# Patient Record
Sex: MALE | Race: WHITE | Employment: FULL TIME | URBAN - METROPOLITAN AREA
[De-identification: names, ages, dates, MRNs, and addresses within clinical notes are randomized per-mention and may not be internally consistent; named-entity substitution may affect disease eponyms.]

---

## 2022-01-28 NOTE — PROGRESS NOTES
[address] [facility] time____________        Surgery time____________      Do not eat  anything after 12:00 midnight prior to your surgery. Nothing to drink within 5 hours of procedure. This includes water chewing gum, mints and ice chips. You may brush your teeth and gargle the morning of your surgery, but do not swallow the water     Please see your family doctor/pediatrician for a history and physical and/or concerning medications. Bring any test results/reports from your physicians office. If you are under the care of a heart doctor or specialist doctor, please be aware that you may be asked to them for clearance    You may be asked to stop blood thinners such as Coumadin, Plavix, Fragmin, Lovenox, etc., or any anti-inflammatories such as:  Aspirin, Ibuprofen, Advil, Naproxen prior to your surgery. We also ask that you stop any OTC medications such as fish oil, vitamin E, glucosamine, garlic, Multivitamins, COQ 10, etc.    We ask that you do not smoke 24 hours prior to surgery  We ask that you do not  drink any alcoholic beverages 24 hours prior to surgery     You must make arrangements for a responsible adult to take you home after your surgery. For your safety you will not be allowed to leave alone or drive yourself home. Your surgery will be cancelled if you do not have a ride home. Also for your safety, it is strongly suggested that someone stay with you the first 24 hours after your surgery. A parent or legal guardian must accompany a child scheduled for surgery and plan to stay at the hospital until the child is discharged. Please do not bring other children with you. For your comfort, please wear simple loose fitting clothing to the hospital.  Please do not bring valuables. Do not wear any make-up or nail polish on your fingers or toes      For your safety, please do not wear any jewelry or body piercing's on the day of surgery.    All jewelry must be removed. If you have dentures, they will be removed before going to operating room. For your convenience, we will provide you with a container. If you wear contact lenses or glasses, they will be removed, please bring a case for them. If you have a living will and a durable power of  for healthcare, please bring in a copy. As part of our patient safety program to minimize surgical site infections, we ask you to do the following:    · Please notify your surgeon if you develop any illness between         now and the  day of your surgery. · This includes a cough, cold, fever, sore throat, nausea,         or vomiting, and diarrhea, etc.  ·  Please notify your surgeon if you experience dizziness, shortness         of breath or blurred vision between now and the time of your surgery. You may shower the night before surgery or the morning of   your surgery with an antibacterial soap. You will need to bring a photo ID and insurance card    [facility] has an onsite pharmacy, would you like to utilize our pharmacy       Our goal is to provide you with excellent care, therefore, visitors will be limited to two(2) in the room at a time so that we may focus on providing this care for you. Please contact pre-admission testing if you have any further questions. [facility] phone number:  [phone]    Please note these are generalized instructions for all surgical cases, you may be provided with more specific instructions according to your surgery.

## 2022-02-03 ENCOUNTER — ANESTHESIA EVENT (OUTPATIENT)
Dept: ENDOSCOPY | Age: 62
End: 2022-02-03
Payer: COMMERCIAL

## 2022-02-04 ENCOUNTER — ANESTHESIA (OUTPATIENT)
Dept: ENDOSCOPY | Age: 62
End: 2022-02-04
Payer: COMMERCIAL

## 2022-02-04 ENCOUNTER — HOSPITAL ENCOUNTER (OUTPATIENT)
Age: 62
Setting detail: OUTPATIENT SURGERY
Discharge: HOME | End: 2022-02-04
Attending: INTERNAL MEDICINE | Admitting: INTERNAL MEDICINE
Payer: COMMERCIAL

## 2022-02-04 VITALS
HEART RATE: 68 BPM | RESPIRATION RATE: 16 BRPM | WEIGHT: 255 LBS | HEIGHT: 72 IN | SYSTOLIC BLOOD PRESSURE: 132 MMHG | TEMPERATURE: 97.2 F | OXYGEN SATURATION: 98 % | BODY MASS INDEX: 34.54 KG/M2 | DIASTOLIC BLOOD PRESSURE: 95 MMHG

## 2022-02-04 VITALS — DIASTOLIC BLOOD PRESSURE: 92 MMHG | SYSTOLIC BLOOD PRESSURE: 130 MMHG | OXYGEN SATURATION: 96 %

## 2022-02-04 DIAGNOSIS — Z12.11 SCREEN FOR COLON CANCER: ICD-10-CM

## 2022-02-04 PROCEDURE — 88305 TISSUE EXAM BY PATHOLOGIST: CPT

## 2022-02-04 PROCEDURE — 7100000010 HC PHASE II RECOVERY - FIRST 15 MIN: Performed by: INTERNAL MEDICINE

## 2022-02-04 PROCEDURE — 2580000003 HC RX 258: Performed by: NURSE ANESTHETIST, CERTIFIED REGISTERED

## 2022-02-04 PROCEDURE — 2500000003 HC RX 250 WO HCPCS: Performed by: NURSE ANESTHETIST, CERTIFIED REGISTERED

## 2022-02-04 PROCEDURE — 3700000000 HC ANESTHESIA ATTENDED CARE: Performed by: INTERNAL MEDICINE

## 2022-02-04 PROCEDURE — 3609010600 HC COLONOSCOPY POLYPECTOMY SNARE/COLD BIOPSY: Performed by: INTERNAL MEDICINE

## 2022-02-04 PROCEDURE — 2709999900 HC NON-CHARGEABLE SUPPLY: Performed by: INTERNAL MEDICINE

## 2022-02-04 PROCEDURE — 3700000001 HC ADD 15 MINUTES (ANESTHESIA): Performed by: INTERNAL MEDICINE

## 2022-02-04 PROCEDURE — 7100000011 HC PHASE II RECOVERY - ADDTL 15 MIN: Performed by: INTERNAL MEDICINE

## 2022-02-04 PROCEDURE — 6360000002 HC RX W HCPCS: Performed by: NURSE ANESTHETIST, CERTIFIED REGISTERED

## 2022-02-04 RX ORDER — PROPOFOL 10 MG/ML
INJECTION, EMULSION INTRAVENOUS PRN
Status: DISCONTINUED | OUTPATIENT
Start: 2022-02-04 | End: 2022-02-04 | Stop reason: SDUPTHER

## 2022-02-04 RX ORDER — SODIUM CHLORIDE 9 MG/ML
25 INJECTION, SOLUTION INTRAVENOUS PRN
Status: DISCONTINUED | OUTPATIENT
Start: 2022-02-04 | End: 2022-02-04 | Stop reason: HOSPADM

## 2022-02-04 RX ORDER — GLYCOPYRROLATE 0.2 MG/ML
INJECTION INTRAMUSCULAR; INTRAVENOUS PRN
Status: DISCONTINUED | OUTPATIENT
Start: 2022-02-04 | End: 2022-02-04 | Stop reason: SDUPTHER

## 2022-02-04 RX ORDER — SODIUM CHLORIDE 0.9 % (FLUSH) 0.9 %
5-40 SYRINGE (ML) INJECTION PRN
Status: DISCONTINUED | OUTPATIENT
Start: 2022-02-04 | End: 2022-02-04 | Stop reason: HOSPADM

## 2022-02-04 RX ORDER — PROPOFOL 10 MG/ML
INJECTION, EMULSION INTRAVENOUS CONTINUOUS PRN
Status: DISCONTINUED | OUTPATIENT
Start: 2022-02-04 | End: 2022-02-04 | Stop reason: SDUPTHER

## 2022-02-04 RX ORDER — SODIUM CHLORIDE 9 MG/ML
INJECTION, SOLUTION INTRAVENOUS CONTINUOUS PRN
Status: DISCONTINUED | OUTPATIENT
Start: 2022-02-04 | End: 2022-02-04 | Stop reason: SDUPTHER

## 2022-02-04 RX ORDER — SODIUM CHLORIDE 0.9 % (FLUSH) 0.9 %
5-40 SYRINGE (ML) INJECTION EVERY 12 HOURS SCHEDULED
Status: DISCONTINUED | OUTPATIENT
Start: 2022-02-04 | End: 2022-02-04 | Stop reason: HOSPADM

## 2022-02-04 RX ORDER — SODIUM CHLORIDE 9 MG/ML
INJECTION, SOLUTION INTRAVENOUS CONTINUOUS
Status: DISCONTINUED | OUTPATIENT
Start: 2022-02-04 | End: 2022-02-04 | Stop reason: HOSPADM

## 2022-02-04 RX ORDER — LIDOCAINE HYDROCHLORIDE 20 MG/ML
INJECTION, SOLUTION EPIDURAL; INFILTRATION; INTRACAUDAL; PERINEURAL PRN
Status: DISCONTINUED | OUTPATIENT
Start: 2022-02-04 | End: 2022-02-04 | Stop reason: SDUPTHER

## 2022-02-04 RX ADMIN — GLYCOPYRROLATE 0.1 MG: 0.2 INJECTION, SOLUTION INTRAMUSCULAR; INTRAVENOUS at 12:58

## 2022-02-04 RX ADMIN — PROPOFOL 80 MG: 10 INJECTION, EMULSION INTRAVENOUS at 13:02

## 2022-02-04 RX ADMIN — SODIUM CHLORIDE: 9 INJECTION, SOLUTION INTRAVENOUS at 12:58

## 2022-02-04 RX ADMIN — PROPOFOL 180 MCG/KG/MIN: 10 INJECTION, EMULSION INTRAVENOUS at 13:02

## 2022-02-04 RX ADMIN — LIDOCAINE HYDROCHLORIDE 80 MG: 20 INJECTION, SOLUTION EPIDURAL; INFILTRATION; INTRACAUDAL; PERINEURAL at 13:02

## 2022-02-04 ASSESSMENT — PAIN SCALES - GENERAL
PAINLEVEL_OUTOF10: 0
PAINLEVEL_OUTOF10: 0

## 2022-02-04 ASSESSMENT — LIFESTYLE VARIABLES: SMOKING_STATUS: 0

## 2022-02-04 ASSESSMENT — PAIN - FUNCTIONAL ASSESSMENT: PAIN_FUNCTIONAL_ASSESSMENT: 0-10

## 2022-02-04 NOTE — ANESTHESIA PRE PROCEDURE
Department of Anesthesiology  Preprocedure Note       Name:  Shanna Giang   Age:  57 y.o.  :  1960                                          MRN:  [id number]         Date:  2022      Surgeon: Marlo Falcon):  Madeleine Keenan MD    Procedure: Procedure(s):  COLORECTAL CANCER SCREENING, NOT HIGH RISK    Medications prior to admission:   Prior to Admission medications    Medication Sig Start Date End Date Taking? Authorizing Provider   simvastatin (ZOCOR) 40 MG tablet Take 40 mg by mouth nightly. Yes Historical Provider, MD   ramipril (ALTACE) 10 MG tablet Take 10 mg by mouth daily. Yes Historical Provider, MD   aspirin 81 MG tablet Take 81 mg by mouth daily. Yes Historical Provider, MD       Current medications:    Current Facility-Administered Medications   Medication Dose Route Frequency Provider Last Rate Last Admin    0.9 % sodium chloride infusion   IntraVENous Continuous Manav Montero MD        sodium chloride flush 0.9 % injection 5-40 mL  5-40 mL IntraVENous 2 times per day Manav Montero MD        sodium chloride flush 0.9 % injection 5-40 mL  5-40 mL IntraVENous PRN Manav Montero MD        0.9 % sodium chloride infusion  25 mL IntraVENous PRN Manav Montero MD           Allergies: Allergies   Allergen Reactions    Cephalosporins      Other reaction(s): Unknown  Rash?? Pt MD fannie told him so  Rash?? Pt MD fannie told him so         Problem List:  There is no problem list on file for this patient.       Past Medical History:        Diagnosis Date    Diverticular disease of large intestine     Hyperlipidemia     Hypertension 2015    Sleep apnea     Tachyarrhythmia 2016    1st degree AV block       Past Surgical History:        Procedure Laterality Date    ABDOMEN SURGERY      colon resection for diverticular diseased    CARDIAC CATHETERIZATION      COLON SURGERY      removed some colon-diverticulitis    COLONOSCOPY  2016    Dr Guillermo Galarza ARTHROSCOPY Left     SHOULDER ARTHROSCOPY Right 2013       Social History:    Social History     Tobacco Use    Smoking status: Never Smoker    Smokeless tobacco: Never Used   Substance Use Topics    Alcohol use: Yes                                Counseling given: Not Answered      Vital Signs (Current):   Vitals:    01/28/22 0857 02/04/22 1159   BP:  (!) 145/90   Pulse:  71   Resp:  17   Temp:  98 °F (36.7 °C)   TempSrc:  Temporal   SpO2:  95%   Weight: 255 lb (115.7 kg) 255 lb (115.7 kg)   Height: 6' (1.829 m) 6' (1.829 m)                                              BP Readings from Last 3 Encounters:   02/04/22 (!) 145/90   11/29/16 126/75   12/20/13 136/85       NPO Status: Time of last liquid consumption: 0700                        Time of last solid consumption: 0600                        Date of last liquid consumption: 02/04/22                        Date of last solid food consumption: 02/03/22    BMI:   Wt Readings from Last 3 Encounters:   02/04/22 255 lb (115.7 kg)   11/22/16 240 lb (108.9 kg)   12/20/13 246 lb (111.6 kg)     Body mass index is 34.58 kg/m². CBC: No results found for: WBC, RBC, HGB, HCT, MCV, RDW, PLT    CMP: No results found for: NA, K, CL, CO2, BUN, CREATININE, GFRAA, AGRATIO, LABGLOM, GLUCOSE, GLU, PROT, CALCIUM, BILITOT, ALKPHOS, AST, ALT    POC Tests: No results for input(s): POCGLU, POCNA, POCK, POCCL, POCBUN, POCHEMO, POCHCT in the last 72 hours.     Coags: No results found for: PROTIME, INR, APTT    HCG (If Applicable): No results found for: PREGTESTUR, PREGSERUM, HCG, HCGQUANT     ABGs: No results found for: PHART, PO2ART, ODH1XVN, HCK2GEF, BEART, J4CVEBUY     Type & Screen (If Applicable):  No results found for: LABABO, LABRH    Drug/Infectious Status (If Applicable):  No results found for: HIV, HEPCAB    COVID-19 Screening (If Applicable): No results found for: COVID19        Anesthesia Evaluation   no history of anesthetic complications:   Airway: Mallampati: II  TM distance: >3 FB   Neck ROM: full  Mouth opening: > = 3 FB Dental:          Pulmonary: breath sounds clear to auscultation  (+) sleep apnea:      (-) COPD, asthma, rhonchi, wheezes, rales and not a current smoker                           Cardiovascular:  Exercise tolerance: poor (<4 METS), [facility]  (+) hypertension:, dysrhythmias (1st degree AV block):, hyperlipidemia    (-) murmur, weak pulses and peripheral edema      Rhythm: regular  Rate: normal                 ROS comment: Echocardiogram (2016):  Summary:   Overall left ventricular ejection fraction is estimated to be 55-60%. The left ventricular wall motion is normal.        Neuro/Psych:      (-) seizures, TIA and CVA           GI/Hepatic/Renal:        (-) liver disease and no renal diseaseMorbid obesity: BMI: 34.6. ROS comment: H/o diverticulosis. Endo/Other:        (-) diabetes mellitus               Abdominal:       Abdomen: soft. Vascular: Other Findings:             Anesthesia Plan      MAC     ASA 2     (Mr. Ayaka Ludwig is NPO appropriate; denies any active reflux / nausea.)  Induction: intravenous. Anesthetic plan and risks discussed with patient. Plan discussed with CRNA. This pre-anesthesia assessment may be used as a history and physical.    DOS STAFF ADDENDUM:    Pt seen and examined, chart reviewed (including anesthesia, drug and allergy history). No interval changes to history and physical examination. Anesthetic plan, risks, benefits, alternatives, and personnel involved discussed with patient. Patient verbalized an understanding and agrees to proceed.       Diana Hodge MD  February 4, 2022  12:51 PM

## 2022-02-04 NOTE — BRIEF OP NOTE
Brief Postoperative Note    Charito Marquis  YOB: 1960  [id number]      Pre-operative Diagnosis: Family history of colon cancer    Previous Colonoscopy: Yes  When: 11/29/16  Greater than 3 years? Yes      Post-operative Diagnosis: Same    Procedure: Colonoscopy    The preparation was excellent.     Anesthesia: MAC    Surgeons/Assistants: Cuca Dalton MD    Estimated Blood Loss: None    Complications: None    Specimens: Was Obtained: Polyp    Findings: See dictated report    Electronically signed by Cuca Dalton MD on 7/10/2017 at 7:45 AM

## 2022-02-04 NOTE — H&P
[facility]   Pre-operative History and Physical    Patient: Isrrael Polanco  : 1960  Acct#:         HISTORY OF PRESENT ILLNESS:    The patient is a 57 y.o. male  who presents with family history of colon cancer  Past Medical History:        Diagnosis Date    Diverticular disease of large intestine     Hyperlipidemia     Hypertension 2015    Sleep apnea     Tachyarrhythmia 2016    1st degree AV block     Past Surgical History:        Procedure Laterality Date    ABDOMEN SURGERY      colon resection for diverticular diseased    CARDIAC CATHETERIZATION      COLON SURGERY      removed some colon-diverticulitis    COLONOSCOPY  2016    Dr Lala Ramos ARTHROSCOPY Left     SHOULDER ARTHROSCOPY Right 2013     Medications prior to admission:   Prior to Admission medications    Medication Sig Start Date End Date Taking? Authorizing Provider   simvastatin (ZOCOR) 40 MG tablet Take 40 mg by mouth nightly. Yes Historical Provider, MD   ramipril (ALTACE) 10 MG tablet Take 10 mg by mouth daily. Yes Historical Provider, MD   aspirin 81 MG tablet Take 81 mg by mouth daily. Yes Historical Provider, MD     Allergies:    Cephalosporins  Social History:   Social History     Socioeconomic History    Marital status:      Spouse name: Not on file    Number of children: Not on file    Years of education: Not on file    Highest education level: Not on file   Occupational History    Not on file   Tobacco Use    Smoking status: Never Smoker    Smokeless tobacco: Never Used   Vaping Use    Vaping Use: Never used   Substance and Sexual Activity    Alcohol use:  Yes    Drug use: No    Sexual activity: Yes     Partners: Female   Other Topics Concern    Not on file   Social History Narrative    Not on file     Social Determinants of Health     Financial Resource Strain:     Difficulty of Paying Living Expenses: Not on file   Food Insecurity:     Worried About [facility] in the Last Year: Not on file    Ran Out of Food in the Last Year: Not on file   Transportation Needs:     Lack of Transportation (Medical): Not on file    Lack of Transportation (Non-Medical): Not on file   Physical Activity:     Days of Exercise per Week: Not on file    Minutes of Exercise per Session: Not on file   Stress:     Feeling of Stress : Not on file   Social Connections:     Frequency of Communication with Friends and Family: Not on file    Frequency of Social Gatherings with Friends and Family: Not on file    Attends Orthodoxy Services: Not on file    Active Member of [address] [place] [address] [employer] or Organizations: Not on file    Attends Club or Organization Meetings: Not on file    Marital Status: Not on file   Intimate Partner Violence:     Fear of Current or Ex-Partner: Not on file    Emotionally Abused: Not on file    Physically Abused: Not on file    Sexually Abused: Not on file   Housing Stability:     Unable to Pay for Housing in the Last Year: Not on file    Number of [place] in the Last Year: Not on file    Unstable Housing in the Last Year: Not on file           Family History:   Family History   Problem Relation Age of Onset    No Known Problems Mother     Heart Disease Father     Other Father         dementia    Heart Attack Father     Diabetes Father     Stroke Father     Colon Cancer Maternal Grandmother          PHYSICAL EXAM:      BP (!) 145/90   Pulse 71   Temp 98 °F (36.7 °C) (Temporal)   Resp 17   Ht 6' (1.829 m)   Wt 255 lb (115.7 kg)   SpO2 95%   BMI 34.58 kg/m²  I        Heart: Normal    Lungs: Normal    Abdomen: Normal      ASA Grade: ASA 3 - Patient with moderate systemic disease with functional limitations    II (soft palate, uvula, fauces visible)  ASSESSMENT AND PLAN:    1. Patient is a 57 y.o. male here for Colonoscopy  2. Procedure options, risks and benefits reviewed with patient who expresses understanding.

## 2022-02-04 NOTE — OP NOTE
Operative Note      Patient: Darylene Clipper  YOB: 1960  MRN: [id number]    Date of Procedure: 2/4/2022    Pre-Op Diagnosis: Screen for colon cancer    Post-Op Diagnosis: Same       Procedure(s):  COLONOSCOPY POLYPECTOMY SNARE/COLD BIOPSY    Surgeon(s):  Jena Suazo MD    Assistant:   * No surgical staff found *    Anesthesia: Monitor Anesthesia Care    Estimated Blood Loss (mL): None    Complications: None    Specimens:   ID Type Source Tests Collected by Time Destination   A : A. bx sigmoid polyp Tissue Colon SURGICAL PATHOLOGY Jena Suazo MD 2/4/2022 1313        Implants:  * No implants in log *      Drains: * No LDAs found *    Findings: See dictated report    Detailed Description of Procedure:   See dictated report    Electronically signed by Jena Suazo MD on 2/4/2022 at 1:20 PM

## 2022-02-04 NOTE — ANESTHESIA POSTPROCEDURE EVALUATION
Department of Anesthesiology  Postprocedure Note    Patient: Izaiah Brewster  MRN: [id number]  YOB: 1960  Date of evaluation: 2/4/2022  Time:  1:30 PM     Procedure Summary     Date: 02/04/22 Room / Location: [address] [place] [address]    Anesthesia Start: 1173 Anesthesia Stop: 36    Procedure: COLONOSCOPY POLYPECTOMY SNARE/COLD BIOPSY (N/A ) Diagnosis:       Screen for colon cancer      (Screen for colon cancer)    Surgeons: Cari Pizarro MD Responsible Provider: Jaja Cortez MD    Anesthesia Type: MAC ASA Status: 2          Anesthesia Type: MAC    Sebastian Phase I: Sebastian Score: 10    Sebastian Phase II: Sebastian Score: 9    Last vitals: Reviewed and per EMR flowsheets. Anesthesia Post Evaluation    Patient location during evaluation: PACU  Patient participation: complete - patient participated  Level of consciousness: awake and alert  Airway patency: patent  Nausea & Vomiting: no nausea and no vomiting  Complications: no  Cardiovascular status: hemodynamically stable and blood pressure returned to baseline  Respiratory status: spontaneous ventilation, nonlabored ventilation and room air  Hydration status: stable  Comments: Mr. Dheeraj Figueredo resting comfortably following procedure. Appropriate for discharge home with , who is present at bedside.       Jaja Cortez MD

## 2022-02-05 NOTE — PROCEDURES
[address] [facility]                               COLON[facility]COPY REPORT    PATIENT NAME: Yvette Henriquez                   :        1960  MED REC NO:   [id number]                          ROOM:  ACCOUNT NO:   [de-identified]                           ADMIT DATE: 2022  PROVIDER:     Jesus Manuel Wetzel MD      DATE OF PROCEDURE:  2022    REFERRING PROVIDER:  Dr. Osvaldo Boxer. INSTRUMENT USED:  Olympus PCF-H190DL. ANESTHESIA:  The patient was premedicated with Diprivan intravenously as  administered by the anesthesiology service. INDICATIONS:  The patient has a family history of colon cancer. PROCEDURE:  The digital and anal exams were normal.  The colonoscope was  inserted to the cecum. The prep was good to excellent. There was a  diminutive sigmoid colon polyp removed via cold biopsy technique. The  patient has had a previous partial colonic resection due to  diverticulitis. The anastomosis was located at the sigmoid colon level  and was normal.  There were scattered diverticula above this level all  the way to the transverse colon. ESTIMATED BLOOD LOSS:  None. IMPRESSION:  1. Colonic polyp, removed, path pending. 2.  Diverticulosis as above. 3.  Status post partial colonic resection. PLAN:  The patient will call the office for biopsy results. He will  require a surveillance colonoscopy in 5 years. HILARIO Lujan MD    D: 2022 13:38:49       T: 2022 13:42:15     [id number]  Job#: [id number]     Doc#: [id number]    CC:  Jesus Manuel Silvestre MD

## (undated) DEVICE — FORCEPS BX 240CM 2.4MM L NDL RAD JAW 4 M00513334